# Patient Record
Sex: MALE | Employment: OTHER | URBAN - METROPOLITAN AREA
[De-identification: names, ages, dates, MRNs, and addresses within clinical notes are randomized per-mention and may not be internally consistent; named-entity substitution may affect disease eponyms.]

---

## 2020-10-23 ENCOUNTER — RECORDS - HEALTHEAST (OUTPATIENT)
Dept: LAB | Facility: CLINIC | Age: 75
End: 2020-10-23

## 2020-10-23 LAB — PHENOBARB SERPL-MCNC: 24.7 UG/ML (ref 20–40)

## 2020-12-01 ENCOUNTER — TRANSFERRED RECORDS (OUTPATIENT)
Dept: HEALTH INFORMATION MANAGEMENT | Facility: CLINIC | Age: 75
End: 2020-12-01

## 2020-12-17 ENCOUNTER — TRANSFERRED RECORDS (OUTPATIENT)
Dept: HEALTH INFORMATION MANAGEMENT | Facility: CLINIC | Age: 75
End: 2020-12-17

## 2020-12-30 ENCOUNTER — TRANSFERRED RECORDS (OUTPATIENT)
Dept: HEALTH INFORMATION MANAGEMENT | Facility: CLINIC | Age: 75
End: 2020-12-30

## 2021-01-05 ENCOUNTER — TRANSFERRED RECORDS (OUTPATIENT)
Dept: HEALTH INFORMATION MANAGEMENT | Facility: CLINIC | Age: 76
End: 2021-01-05

## 2021-01-14 ENCOUNTER — TRANSFERRED RECORDS (OUTPATIENT)
Dept: HEALTH INFORMATION MANAGEMENT | Facility: CLINIC | Age: 76
End: 2021-01-14

## 2021-01-18 ENCOUNTER — TRANSFERRED RECORDS (OUTPATIENT)
Dept: HEALTH INFORMATION MANAGEMENT | Facility: CLINIC | Age: 76
End: 2021-01-18

## 2021-01-19 ENCOUNTER — TRANSFERRED RECORDS (OUTPATIENT)
Dept: HEALTH INFORMATION MANAGEMENT | Facility: CLINIC | Age: 76
End: 2021-01-19

## 2021-01-19 ENCOUNTER — MEDICAL CORRESPONDENCE (OUTPATIENT)
Dept: HEALTH INFORMATION MANAGEMENT | Facility: CLINIC | Age: 76
End: 2021-01-19

## 2021-01-20 ENCOUNTER — TELEPHONE (OUTPATIENT)
Dept: VASCULAR SURGERY | Facility: CLINIC | Age: 76
End: 2021-01-20

## 2021-01-20 ENCOUNTER — TRANSCRIBE ORDERS (OUTPATIENT)
Dept: OTHER | Age: 76
End: 2021-01-20

## 2021-01-20 DIAGNOSIS — K76.89 LIVER CYST: Primary | ICD-10-CM

## 2021-01-20 DIAGNOSIS — C18.9 COLON CANCER (H): ICD-10-CM

## 2021-01-20 NOTE — TELEPHONE ENCOUNTER
Called and left a msg fo daughter Erlinda.     Informed her that I am calling to see if we can help make an appt with Dr. Franco as we received a referral from [facility] Dr Blazer.     Left my direct line for her to return my call.     Teresa KEENE RN, BSN  Interventional Radiology/Vascular  Nurse Coordinator   Phone: [phone]  Fax: [phone]

## 2021-01-27 ENCOUNTER — TELEPHONE (OUTPATIENT)
Dept: VASCULAR SURGERY | Facility: CLINIC | Age: 76
End: 2021-01-27

## 2021-01-27 NOTE — TELEPHONE ENCOUNTER
Daughter called in regarding appt with dr. munguia and consult for met colorectal.     She states that she cannot make the 2/2 appt but we will plan for 2/9 at 130pm with Dr. Munguia.     She agrees to plan     Pt is at a TCU in which they are on lockdown at this time. We will plan too connect with her for the consult and then 3-way pt into the consult.     She agrees to plan.     Teresa KEENE RN, BSN  Interventional Radiology/Vascular  Nurse Coordinator   Phone: [phone]  Fax: [phone]

## 2021-01-27 NOTE — TELEPHONE ENCOUNTER
2nd attempt to reach daughter for an appt.     Informed her that we have made an appt for pt and Dr Franco for next week.     Informed her that I have father scheduled for consult on Tues 2/2 at 315pm     Tele visit and we have placed her number as the one to contact on the date of consult    If this doesn''t work for her then I've asked her to return my call at direct line     Teresa KEENE RN, BSN  Interventional Radiology/Vascular  Nurse Coordinator   Phone: [phone]  Fax: [phone]

## 2021-02-08 NOTE — TELEPHONE ENCOUNTER
DIAGNOSIS: New met colorectal, thiago Moore  [phone]    DATE RECEIVED: 2.9.21   NOTES STATUS DETAILS   OFFICE NOTE from referring provider CE 1.11.21, 1.5.21  Dr. Skyler Ravi   [facility]   OFFICE NOTE from other specialist na    DISCHARGE SUMMARY from hospital CE 12.17.20-1.3.21  Dr. Phu DYER Mormon   DISCHARGE REPORT from the ER na    OPERATIVE REPORT CE 12.21.20  Lap Exp Abd   MEDICATION LIST CE    XRAYS (IMAGES & REPORTS) Pacs 1.14.21  CT Chest/Abd/Pelvis    12.30.20  CT Liver Bx    12.17.20, 10.22.20  CT Abd/Pelvis    12.14.20  MR Liver   PATHOLOGY  Slides & report na

## 2021-02-09 ENCOUNTER — VIRTUAL VISIT (OUTPATIENT)
Dept: RADIOLOGY | Facility: CLINIC | Age: 76
End: 2021-02-09
Attending: RADIOLOGY
Payer: COMMERCIAL

## 2021-02-09 ENCOUNTER — PRE VISIT (OUTPATIENT)
Dept: RADIOLOGY | Facility: CLINIC | Age: 76
End: 2021-02-09

## 2021-02-09 DIAGNOSIS — C19 METASTATIC COLORECTAL CANCER: Primary | ICD-10-CM

## 2021-02-09 DIAGNOSIS — C78.7 METASTASIS TO LIVER (H): ICD-10-CM

## 2021-02-09 PROCEDURE — 99203 OFFICE O/P NEW LOW 30 MIN: CPT | Performed by: RADIOLOGY

## 2021-02-09 PROCEDURE — 999N001193 HC VIDEO/TELEPHONE VISIT; NO CHARGE

## 2021-02-09 RX ORDER — GABAPENTIN 100 MG/1
100 CAPSULE ORAL
COMMUNITY
Start: 2021-01-11

## 2021-02-09 RX ORDER — FERROUS SULFATE 325(65) MG
325 TABLET ORAL
COMMUNITY
Start: 2020-09-29

## 2021-02-09 RX ORDER — FOLIC ACID 1 MG/1
1 TABLET ORAL
COMMUNITY
Start: 2020-09-21

## 2021-02-09 RX ORDER — ACETAMINOPHEN 500 MG
1000 TABLET ORAL
COMMUNITY
Start: 2021-02-08

## 2021-02-09 RX ORDER — PHENOBARBITAL 30 MG/1
32.4 TABLET ORAL
COMMUNITY
Start: 2021-01-27

## 2021-02-09 NOTE — LETTER
2/9/2021         RE: Taiwo Yung  [address]  [place] [address]        Dear Colleague,    Thank you for referring your patient, Taiwo Yung, to the [facility]. Please see a copy of my visit note below.    Taiwo is a 75 year old who is being evaluated via a billable video visit.    If the video visit is dropped, the invitation should be resent by:   Will anyone else be joining your video visit?     Video Start Time: 1:30    HPI:  Mr. Yung is a pleasant 75 year old patient referred by Dr. 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  Viral has been diagnosed with a colon cancer that was removed in October 2010. All seems to have started after a fall. He has a history of frequent falls. After the surgery he had bowel obstructions and since then he is just having many health issues.  He has been malnourished and lost a lot of weight. A CT scan showed a liver lesion that was biopsied in December and was diagnosed as a CRC mets.   Dr. Tubbs thinks that the patient is too fragile to have a systemic chemotherapy. His ECOG is 3.  I have discussed with the patient and his daughter. The patient is doing a little better, as he is back home. He is still very fragile. I reviewed with the patient the different liver directed therapy, their risks and benefits.   I have reviewed the images and the labs.     Past Medical History:  AFib, CKD stage 3, epilepsy    Social History     Tobacco Use     Smoking status: Not on file   Substance Use Topics     Alcohol use: Not on file     Current Outpatient Medications   Medication     acetaminophen (TYLENOL) 500 MG tablet     Cyanocobalamin 5000 MCG SUBL     ferrous sulfate (FEROSUL) 325 (65 Fe) MG tablet     folic acid (FOLVITE) 1 MG tablet     gabapentin (NEURONTIN) 100 MG capsule     PHENobarbital (LUMINAL) 30 MG tablet     sertraline (ZOLOFT) 50 MG tablet     No current facility-administered medications for this  visit.        Impression and plan:    At this point, the patient is still not in good condition to undergo any of the IR treatment. I think if he henriquez a little more weight and feels better, he could eventually benefit from Radioembolization.  We agreed that while he tries to eat better and takes care of himself, we should reconnect in about two months with new labs and a new CT scan. Based on his PS and CT, we could decide the right course of action.      Total time: 30 min    Video-Visit Details    Type of service:  Video Visit    Video End Time: 1:47    Originating Location (pt. Location): Home    Distant Location (provider location):  [facility]     Platform used for Video Visit: Shameka is a 75 year old who is being evaluated via a billable video visit.      Again, thank you for allowing me to participate in the care of your patient.      Sincerely,    Ander Franco MD

## 2021-02-09 NOTE — PROGRESS NOTES
Taiwo is a 75 year old who is being evaluated via a billable video visit.    If the video visit is dropped, the invitation should be resent by:   Will anyone else be joining your video visit?     Video Start Time: 1:30    HPI:  Mr. Yung is a pleasant 75 year old patient referred by Dr. 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  Viral has been diagnosed with a colon cancer that was removed in October 2010. All seems to have started after a fall. He has a history of frequent falls. After the surgery he had bowel obstructions and since then he is just having many health issues.  He has been malnourished and lost a lot of weight. A CT scan showed a liver lesion that was biopsied in December and was diagnosed as a CRC mets.   Dr. Tubbs thinks that the patient is too fragile to have a systemic chemotherapy. His ECOG is 3.  I have discussed with the patient and his daughter. The patient is doing a little better, as he is back home. He is still very fragile. I reviewed with the patient the different liver directed therapy, their risks and benefits.   I have reviewed the images and the labs.     Past Medical History:  AFib, CKD stage 3, epilepsy    Social History     Tobacco Use     Smoking status: Not on file   Substance Use Topics     Alcohol use: Not on file     Current Outpatient Medications   Medication     acetaminophen (TYLENOL) 500 MG tablet     Cyanocobalamin 5000 MCG SUBL     ferrous sulfate (FEROSUL) 325 (65 Fe) MG tablet     folic acid (FOLVITE) 1 MG tablet     gabapentin (NEURONTIN) 100 MG capsule     PHENobarbital (LUMINAL) 30 MG tablet     sertraline (ZOLOFT) 50 MG tablet     No current facility-administered medications for this visit.        Impression and plan:    At this point, the patient is still not in good condition to undergo any of the IR treatment. I think if he henriquez a little more weight and feels better, he could eventually benefit from Radioembolization.  We agreed  that while he tries to eat better and takes care of himself, we should reconnect in about two months with new labs and a new CT scan. Based on his PS and CT, we could decide the right course of action.      Total time: 30 min    Video-Visit Details    Type of service:  Video Visit    Video End Time: 1:47    Originating Location (pt. Location): Home    Distant Location (provider location):  [facility]     Platform used for Video Visit: Shameka is a 75 year old who is being evaluated via a billable video visit.

## 2021-02-10 ENCOUNTER — TELEPHONE (OUTPATIENT)
Dept: VASCULAR SURGERY | Facility: CLINIC | Age: 76
End: 2021-02-10

## 2021-02-10 ENCOUNTER — RECORDS - HEALTHEAST (OUTPATIENT)
Dept: LAB | Facility: CLINIC | Age: 76
End: 2021-02-10

## 2021-02-10 NOTE — TELEPHONE ENCOUNTER
Called pt and left a msg    Inquired on their consult yesterday with Dr. Franco.     Asked if she had any questions and am wondering where she'd like to have fup appts at   Whether its at the [facility] or [facility].     Asked that she return my call to further discuss.     Teresa KEENE RN, BSN  Interventional Radiology/Vascular  Nurse Coordinator   Phone: [phone]  Fax: [phone]

## 2021-02-11 LAB
ANION GAP SERPL CALCULATED.3IONS-SCNC: 7 MMOL/L (ref 5–18)
BUN SERPL-MCNC: 25 MG/DL (ref 8–28)
CALCIUM SERPL-MCNC: 9 MG/DL (ref 8.5–10.5)
CHLORIDE BLD-SCNC: 108 MMOL/L (ref 98–107)
CO2 SERPL-SCNC: 26 MMOL/L (ref 22–31)
CREAT SERPL-MCNC: 1 MG/DL (ref 0.7–1.3)
GFR SERPL CREATININE-BSD FRML MDRD: >60 ML/MIN/1.73M2
GLUCOSE BLD-MCNC: 76 MG/DL (ref 70–125)
HGB BLD-MCNC: 10.6 G/DL (ref 14–18)
IRON SERPL-MCNC: 58 UG/DL (ref 42–175)
POTASSIUM BLD-SCNC: 4.3 MMOL/L (ref 3.5–5)
SODIUM SERPL-SCNC: 141 MMOL/L (ref 136–145)
VIT B12 SERPL-MCNC: 731 PG/ML (ref 213–816)

## 2021-02-12 LAB — 25(OH)D3 SERPL-MCNC: 31 NG/ML (ref 30–80)

## 2021-03-31 ENCOUNTER — RECORDS - HEALTHEAST (OUTPATIENT)
Dept: LAB | Facility: CLINIC | Age: 76
End: 2021-03-31

## 2021-04-01 LAB
CHOLEST SERPL-MCNC: 178 MG/DL
FASTING STATUS PATIENT QL REPORTED: NORMAL
HDLC SERPL-MCNC: 52 MG/DL
LDLC SERPL CALC-MCNC: 108 MG/DL
TRIGL SERPL-MCNC: 92 MG/DL

## 2021-04-12 ENCOUNTER — TELEPHONE (OUTPATIENT)
Dept: VASCULAR SURGERY | Facility: CLINIC | Age: 76
End: 2021-04-12

## 2021-04-12 NOTE — TELEPHONE ENCOUNTER
Called daughter to fup on pt's status.    She states that pt has had updated Ct scan as well as labs.     We will pull those images over and also plan for  fup appt to evaluate it pt is an appropriate candidate for Y90 with Dr. Franco.     Due to pt's current living arrangement we will plan for an off day of consult due to pt lives in nursing home and daughter will need to go over to his facility to help with video  visit.     Will plan accordingly and then call her back     Teresa KEENE RN, BSN  Interventional Radiology/Vascular  Nurse Coordinator   Phone: [phone]  Fax: [phone]

## 2021-04-13 ENCOUNTER — TELEPHONE (OUTPATIENT)
Dept: VASCULAR SURGERY | Facility: CLINIC | Age: 76
End: 2021-04-13

## 2021-04-13 NOTE — TELEPHONE ENCOUNTER
Called daughter Erlinda to inform her that we have pt scheduled for Friday with Dr. Franco for follow up consult.     Daughter agreed to plan.     Teresa KEENE RN, BSN  Interventional Radiology/Vascular  Nurse Coordinator   Phone: [phone]  Fax: [phone]

## 2021-04-16 ENCOUNTER — TELEPHONE (OUTPATIENT)
Dept: SURGERY | Facility: CLINIC | Age: 76
End: 2021-04-16

## 2021-04-16 ENCOUNTER — VIRTUAL VISIT (OUTPATIENT)
Dept: VASCULAR SURGERY | Facility: CLINIC | Age: 76
End: 2021-04-16
Payer: COMMERCIAL

## 2021-04-16 DIAGNOSIS — C22.8 PRIMARY MALIGNANT NEOPLASM OF LIVER (H): Primary | ICD-10-CM

## 2021-04-16 PROCEDURE — 99214 OFFICE O/P EST MOD 30 MIN: CPT | Mod: 95 | Performed by: RADIOLOGY

## 2021-04-16 RX ORDER — VITAMIN B COMPLEX
TABLET ORAL DAILY
COMMUNITY

## 2021-04-16 SDOH — HEALTH STABILITY: MENTAL HEALTH: HOW MANY STANDARD DRINKS CONTAINING ALCOHOL DO YOU HAVE ON A TYPICAL DAY?: NOT ASKED

## 2021-04-16 SDOH — HEALTH STABILITY: MENTAL HEALTH: HOW OFTEN DO YOU HAVE 6 OR MORE DRINKS ON ONE OCCASION?: NOT ASKED

## 2021-04-16 SDOH — HEALTH STABILITY: MENTAL HEALTH: HOW OFTEN DO YOU HAVE A DRINK CONTAINING ALCOHOL?: NOT ASKED

## 2021-04-16 ASSESSMENT — PAIN SCALES - GENERAL: PAINLEVEL: NO PAIN (0)

## 2021-04-16 NOTE — TELEPHONE ENCOUNTER
Lm for patient regarding video visit today at 9:30 with .    Called patient home first will try daughter.    Call back number was provided.

## 2021-04-16 NOTE — LETTER
4/16/2021       RE: Taiwo Yung  [address]  [place] [address]     Dear Colleague,    Thank you for referring your patient, Taiwo Yung, to the [facility] [place] [facility]. Please see a copy of my visit note below.    Taiwo is a 75 year old who is being evaluated via a billable video visit.      How would you like to obtain your AVS? Mail a copy  If the video visit is dropped, the invitation should be resent by: Text to cell phone: [phone] doximity  Will anyone else be joining your video visit? No    Video Start Time: 9:30    Subjective      HPI:    Mr. Yung is a pleasant 75 year old patient referred by Dr. 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  Viral has been diagnosed with a colon cancer that was removed in October 2010. All seems to have started after a fall. He has a history of frequent falls. After the surgery he had bowel obstructions and since then he is just having many health issues.  He has been malnourished and lost a lot of weight. A CT scan showed a liver lesion that was biopsied in December and was diagnosed as a CRC mets.   The patient is too fragile to have a systemic chemotherapy and was considered too fragile for a liver directed therapy in February. His ECOG is still 3.  I have discussed with the patient and his daughter. They both think that he is doing better. The patient still thinks he needs some more time to gain some strength.     I have reviewed the images and the labs.   CT performed in March  Was compared to his January CT. The left lobe lesion seems slightly larger.   Past Medical History:  AFib, CKD stage 3, epilepsy     Social History           Tobacco Use     Smoking status: Not on file   Substance Use Topics     Alcohol use: Not on file          Current Outpatient Medications   Medication     acetaminophen (TYLENOL) 500 MG tablet     Cyanocobalamin 5000 MCG  SUBL     ferrous sulfate (FEROSUL) 325 (65 Fe) MG tablet     folic acid (FOLVITE) 1 MG tablet     gabapentin (NEURONTIN) 100 MG capsule     PHENobarbital (LUMINAL) 30 MG tablet     sertraline (ZOLOFT) 50 MG tablet      No current facility-administered medications for this visit.          Impression and plan:     At this point, the patient is still not in good condition to undergo any of the IR treatment. We agreed to wait another month and see how things are going. If he has a better PS, I would proceed with Y 90 of his left lobe lesion and at a later time, an ablation or another Y 90 for his right lobe lesion.     The patient agreed with the plan.      Video-Visit Details    Type of service:  Video Visit    Video End Time:9:45    Originating Location (pt. Location): Home    Distant Location (provider location):  [facility] [place]     Platform used for Video Visit: [employer]     Total time: 35 min.    Again, thank you for allowing me to participate in the care of your patient.      Sincerely,    Ander Franco MD

## 2021-04-16 NOTE — NURSING NOTE
Vascular Rooming Note     Taiwo Yung's goals for this visit include:   Chief Complaint   Patient presents with     SIDNEY Maravilla, is participating in a video visit today for a follow up LDT, feeling well, gaining weight, after latest imaging will there be a discussion regarding future surgery for the liver, as reported by patient and daughter Erlinda.     Kassidy Solano LPN

## 2021-04-16 NOTE — TELEPHONE ENCOUNTER
Spoke with Erlinda daughter of the patient in regards to video visit today with .    Erlinda stated that she was not at her fathers home yet and to call at 98:30.

## 2021-04-16 NOTE — PROGRESS NOTES
Taiwo is a 75 year old who is being evaluated via a billable video visit.      How would you like to obtain your AVS? Mail a copy  If the video visit is dropped, the invitation should be resent by: Text to cell phone: [phone] doximity  Will anyone else be joining your video visit? No    Video Start Time: 9:30        Subjective      HPI:    Mr. Yung is a pleasant 75 year old patient referred by Dr. 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  Viral has been diagnosed with a colon cancer that was removed in October 2010. All seems to have started after a fall. He has a history of frequent falls. After the surgery he had bowel obstructions and since then he is just having many health issues.  He has been malnourished and lost a lot of weight. A CT scan showed a liver lesion that was biopsied in December and was diagnosed as a CRC mets.   The patient is too fragile to have a systemic chemotherapy and was considered too fragile for a liver directed therapy in February. His ECOG is still 3.  I have discussed with the patient and his daughter. They both think that he is doing better. The patient still thinks he needs some more time to gain some strength.     I have reviewed the images and the labs.   CT performed in March  Was compared to his January CT. The left lobe lesion seems slightly larger.   Past Medical History:  AFib, CKD stage 3, epilepsy     Social History           Tobacco Use     Smoking status: Not on file   Substance Use Topics     Alcohol use: Not on file          Current Outpatient Medications   Medication     acetaminophen (TYLENOL) 500 MG tablet     Cyanocobalamin 5000 MCG SUBL     ferrous sulfate (FEROSUL) 325 (65 Fe) MG tablet     folic acid (FOLVITE) 1 MG tablet     gabapentin (NEURONTIN) 100 MG capsule     PHENobarbital (LUMINAL) 30 MG tablet     sertraline (ZOLOFT) 50 MG tablet      No current facility-administered medications for this visit.          Impression  and plan:     At this point, the patient is still not in good condition to undergo any of the IR treatment. We agreed to wait another month and see how things are going. If he has a better PS, I would proceed with Y 90 of his left lobe lesion and at a later time, an ablation or another Y 90 for his right lobe lesion.     The patient agreed with the plan.        Video-Visit Details    Type of service:  Video Visit    Video End Time:9:45    Originating Location (pt. Location): Home    Distant Location (provider location):  [facility] [place]     Platform used for Video Visit: [email or web address]     Total time: 35 min.

## 2021-05-03 ENCOUNTER — TELEPHONE (OUTPATIENT)
Dept: VASCULAR SURGERY | Facility: CLINIC | Age: 76
End: 2021-05-03

## 2021-05-03 DIAGNOSIS — C78.7 METASTASIS TO LIVER (H): ICD-10-CM

## 2021-05-03 DIAGNOSIS — C19 METASTATIC COLORECTAL CANCER: Primary | ICD-10-CM

## 2021-05-04 NOTE — TELEPHONE ENCOUNTER
Called daughter to follow up on the last visit with Dr Franco.     Talked about follow up in which daughter states that Dr Franco would like to do an assessment on the date of mapping.     Informed her that I'll send a consent form to her re: sirtex treatment and then have her father sign and return to me.     She verbalized understanding.     Teresa KEENE RN, BSN  Interventional Radiology/Vascular  Nurse Coordinator   Phone: [phone]  Fax: [phone]

## 2021-05-10 PROBLEM — C18.7 CANCER OF SIGMOID COLON (H): Status: ACTIVE | Noted: 2021-05-10

## 2021-05-10 PROBLEM — C78.7 METASTASIS TO LIVER (H): Status: ACTIVE | Noted: 2021-05-10

## 2021-06-02 ENCOUNTER — TELEPHONE (OUTPATIENT)
Dept: VASCULAR SURGERY | Facility: CLINIC | Age: 76
End: 2021-06-02

## 2021-06-03 NOTE — TELEPHONE ENCOUNTER
Returned daughter's phone call re: Y90 appt    I informed her that I sent her an email in which I am waiting on the consent form.     I have not received it yet.    I did walk her through the process and that currently I have WEds 6/30 and Thurs 7/1 available for Y90 treatment dates for Dr. Franco.    We will connect again when I have pt scheduled.     She agrees to plan.     Teresa KEENE RN, BSN  Interventional Radiology/Vascular  Nurse Coordinator   Phone: [phone]  Fax: [phone]

## 2021-06-04 ENCOUNTER — TELEPHONE (OUTPATIENT)
Dept: VASCULAR SURGERY | Facility: CLINIC | Age: 76
End: 2021-06-04

## 2021-06-04 DIAGNOSIS — C78.7 METASTASIS TO LIVER (H): ICD-10-CM

## 2021-06-04 DIAGNOSIS — C19 METASTATIC COLORECTAL CANCER: Primary | ICD-10-CM

## 2021-06-04 NOTE — TELEPHONE ENCOUNTER
Called daughter and informed her that Dr. Franco did review the Ct scan and since last one was noted in March 2021 he's requesting for an updated CT scan    Daughter states that Dr. Ravi's office will fup with pt post treatment.     I will let Dr. Ravi's office know that Dr. Franco is requesting for updated scans prior to treatment         Teresa KEENE RN, BSN  Interventional Radiology/Vascular  Nurse Coordinator   Phone: [phone]  Fax: [phone]

## 2021-06-07 ENCOUNTER — HOSPITAL ENCOUNTER (OUTPATIENT)
Facility: CLINIC | Age: 76
End: 2021-06-07
Admitting: RADIOLOGY
Payer: COMMERCIAL

## 2021-06-07 DIAGNOSIS — Z11.59 ENCOUNTER FOR SCREENING FOR OTHER VIRAL DISEASES: ICD-10-CM

## 2021-06-11 ENCOUNTER — TEAM CONFERENCE (OUTPATIENT)
Dept: VASCULAR SURGERY | Facility: CLINIC | Age: 76
End: 2021-06-11

## 2021-06-11 NOTE — TELEPHONE ENCOUNTER
Jude Mooer  Thank you for your referral for patient [id number]. Per Bethanie at [facility], a predetermination is not  required when both provider and facility are contracted with the health plan. I have confirmed that both  Dr. Ander Franco and the [facility] are contracted with [facility]. I have completed a benefit  investigation for the patient and have included the details below.  Please see attached the medical Policy for [facility] for your review    [employer] Benefits  [employer] Cap  Effective Date 2/1/21  Coinsurance 100 %  Deductible 0  OOP max 0  Hospital Copay  0    Given that the plan will not provide a pre-determination review we are not able provide an  authorization number and date span for the procedures(s).   We recommend that you notify the treating provider to let them know that there is no authorization  allowed for the procedure.      Y90 Treatment

## 2021-06-23 ENCOUNTER — TELEPHONE (OUTPATIENT)
Dept: VASCULAR SURGERY | Facility: CLINIC | Age: 76
End: 2021-06-23

## 2021-06-23 RX ORDER — HEPARIN SODIUM 200 [USP'U]/100ML
1 INJECTION, SOLUTION INTRAVENOUS CONTINUOUS PRN
Status: CANCELLED | OUTPATIENT
Start: 2021-06-23

## 2021-06-23 RX ORDER — LIDOCAINE 40 MG/G
CREAM TOPICAL
Status: CANCELLED | OUTPATIENT
Start: 2021-06-23

## 2021-06-23 RX ORDER — SODIUM CHLORIDE 9 MG/ML
INJECTION, SOLUTION INTRAVENOUS CONTINUOUS
Status: CANCELLED | OUTPATIENT
Start: 2021-06-23

## 2021-06-23 NOTE — TELEPHONE ENCOUNTER
Called Erlinda to furohan on pt's CT scan that was requested of Dr Tubbs's office    She states that pt is having his CT scan completed on Fri 6/26 at 2pm as well as labs    She talked about the covid test and requested that if we can also accept covid test on Fri 6/26 due to she will be with pt and its' hard to get him out of his LTAC.     I informed her that I will check and the let her know.         Teresa KEENE RN, BSN  Interventional Radiology/Vascular  Nurse Coordinator   Phone: [phone]  Fax: [phone]

## 2021-06-24 ENCOUNTER — TELEPHONE (OUTPATIENT)
Dept: SURGERY | Facility: CLINIC | Age: 76
End: 2021-06-24

## 2021-06-24 NOTE — TELEPHONE ENCOUNTER
Reason for Call:  Other COVID test orders    Detailed comments: Patients daughter KULWINDER Coffey called and wants patient to have COVID testing elsewhere for pre procedures on June 20 and July 1 at [facility].  Would like patient to go to [facility] instead.  States that surgeon and provider at [facility] are working hand in hand and should already receive the report prior to surgery.  If cannot do.  Please contact daughter for the fax number for Dr. Ander Franco so he can get results.  Thank you.    Phone Number Patient can be reached at: Other phone number:  [phone] KULWINDER Coffey*    Best Time: any    Can we leave a detailed message on this number? YES    Call taken on 6/24/2021 at 10:23 AM by Yesenia Bateman

## 2021-06-25 ENCOUNTER — RECORDS - HEALTHEAST (OUTPATIENT)
Dept: LAB | Facility: CLINIC | Age: 76
End: 2021-06-25

## 2021-06-25 RX ORDER — AMPICILLIN AND SULBACTAM 2; 1 G/1; G/1
3 INJECTION, POWDER, FOR SOLUTION INTRAMUSCULAR; INTRAVENOUS
Status: CANCELLED | OUTPATIENT
Start: 2021-06-25

## 2021-06-25 RX ORDER — SODIUM CHLORIDE 9 MG/ML
INJECTION, SOLUTION INTRAVENOUS CONTINUOUS
Status: CANCELLED | OUTPATIENT
Start: 2021-06-25

## 2021-06-25 RX ORDER — HEPARIN SODIUM 200 [USP'U]/100ML
1 INJECTION, SOLUTION INTRAVENOUS CONTINUOUS PRN
Status: CANCELLED | OUTPATIENT
Start: 2021-06-25

## 2021-06-25 RX ORDER — LIDOCAINE 40 MG/G
CREAM TOPICAL
Status: CANCELLED | OUTPATIENT
Start: 2021-06-25

## 2021-06-25 RX ORDER — ONDANSETRON 2 MG/ML
4 INJECTION INTRAMUSCULAR; INTRAVENOUS ONCE
Status: CANCELLED | OUTPATIENT
Start: 2021-06-25 | End: 2021-06-25

## 2021-06-25 NOTE — TELEPHONE ENCOUNTER
Returned daughter's call in which I informed her that due to the circumstances of getting pt in and out for his covid testing and the ease of getting labs, CT scan and covid testing to be done today I informed her that she should go ahead and complete it.     All appts to be done by his PCP at [facility].     I informed her that I did try to send a msg however Dr. Franco is [place] and I've not had a response.     She states that she will see what she can do.      Teresa KEENE RN, BSN  Interventional Radiology/Vascular  Nurse Coordinator   Phone: [phone]  Fax: [phone] g

## 2021-06-28 ENCOUNTER — RECORDS - HEALTHEAST (OUTPATIENT)
Dept: LAB | Facility: CLINIC | Age: 76
End: 2021-06-28

## 2021-06-28 LAB
ANION GAP SERPL CALCULATED.3IONS-SCNC: 12 MMOL/L (ref 5–18)
BASOPHILS # BLD AUTO: 0 THOU/UL (ref 0–0.2)
BASOPHILS NFR BLD AUTO: 0 % (ref 0–2)
BUN SERPL-MCNC: 25 MG/DL (ref 8–28)
CALCIUM SERPL-MCNC: 8.4 MG/DL (ref 8.5–10.5)
CHLORIDE BLD-SCNC: 106 MMOL/L (ref 98–107)
CO2 SERPL-SCNC: 23 MMOL/L (ref 22–31)
CREAT SERPL-MCNC: 1.24 MG/DL (ref 0.7–1.3)
EOSINOPHIL # BLD AUTO: 0.3 THOU/UL (ref 0–0.4)
EOSINOPHIL NFR BLD AUTO: 6 % (ref 0–6)
ERYTHROCYTE [DISTWIDTH] IN BLOOD BY AUTOMATED COUNT: 13.2 % (ref 11–14.5)
FERRITIN SERPL-MCNC: 61 NG/ML (ref 27–300)
GFR SERPL CREATININE-BSD FRML MDRD: 56 ML/MIN/1.73M2
GLUCOSE BLD-MCNC: 90 MG/DL (ref 70–125)
HCT VFR BLD AUTO: 31.3 % (ref 40–54)
HGB BLD-MCNC: 10.6 G/DL (ref 14–18)
IMM GRANULOCYTES # BLD: 0 THOU/UL
IMM GRANULOCYTES NFR BLD: 0 %
IRON SERPL-MCNC: 55 UG/DL (ref 42–175)
LYMPHOCYTES # BLD AUTO: 1.4 THOU/UL (ref 0.8–4.4)
LYMPHOCYTES NFR BLD AUTO: 26 % (ref 20–40)
MCH RBC QN AUTO: 32.9 PG (ref 27–34)
MCHC RBC AUTO-ENTMCNC: 33.9 G/DL (ref 32–36)
MCV RBC AUTO: 97 FL (ref 80–100)
MONOCYTES # BLD AUTO: 0.4 THOU/UL (ref 0–0.9)
MONOCYTES NFR BLD AUTO: 8 % (ref 2–10)
NEUTROPHILS # BLD AUTO: 3.1 THOU/UL (ref 2–7.7)
NEUTROPHILS NFR BLD AUTO: 60 % (ref 50–70)
PLATELET # BLD AUTO: 239 THOU/UL (ref 140–440)
PMV BLD AUTO: 9.9 FL (ref 8.5–12.5)
POTASSIUM BLD-SCNC: 4.3 MMOL/L (ref 3.5–5)
RBC # BLD AUTO: 3.22 MILL/UL (ref 4.4–6.2)
SODIUM SERPL-SCNC: 141 MMOL/L (ref 136–145)
WBC: 5.2 THOU/UL (ref 4–11)

## 2021-06-30 ENCOUNTER — TELEPHONE (OUTPATIENT)
Dept: VASCULAR SURGERY | Facility: CLINIC | Age: 76
End: 2021-06-30

## 2021-06-30 NOTE — TELEPHONE ENCOUNTER
Daughter called and informed me that  was going to call Dr Franco re: canceling Y90     She states that they had the scans completed last week and pt now has Met disease     She state that the Y90 procedure will now need to be canceled.     Informed her that we were unaware of this and that we will go ahead and cancel the procedure and apologized for his news.     Teresa KEENE RN, BSN  Interventional Radiology/Vascular  Nurse Coordinator   Phone: [phone]  Fax: [phone]

## 2022-01-01 ENCOUNTER — LAB REQUISITION (OUTPATIENT)
Dept: LAB | Facility: CLINIC | Age: 77
End: 2022-01-01
Payer: COMMERCIAL

## 2022-01-01 DIAGNOSIS — M62.81 MUSCLE WEAKNESS (GENERALIZED): ICD-10-CM

## 2022-01-01 DIAGNOSIS — J96.01 ACUTE RESPIRATORY FAILURE WITH HYPOXIA (H): ICD-10-CM

## 2022-01-01 DIAGNOSIS — G40.909 EPILEPSY, UNSPECIFIED, NOT INTRACTABLE, WITHOUT STATUS EPILEPTICUS (H): ICD-10-CM

## 2022-01-01 DIAGNOSIS — N18.31 CHRONIC KIDNEY DISEASE, STAGE 3A (H): ICD-10-CM

## 2022-01-01 DIAGNOSIS — R05.1 ACUTE COUGH: ICD-10-CM

## 2022-01-01 LAB
ANION GAP SERPL CALCULATED.3IONS-SCNC: 12 MMOL/L (ref 5–18)
ANION GAP SERPL CALCULATED.3IONS-SCNC: 8 MMOL/L (ref 5–18)
BASOPHILS # BLD AUTO: 0 10E3/UL (ref 0–0.2)
BASOPHILS # BLD AUTO: 0 10E3/UL (ref 0–0.2)
BASOPHILS NFR BLD AUTO: 1 %
BASOPHILS NFR BLD AUTO: 1 %
BUN SERPL-MCNC: 19 MG/DL (ref 8–28)
BUN SERPL-MCNC: 24 MG/DL (ref 8–28)
CALCIUM SERPL-MCNC: 7.9 MG/DL (ref 8.5–10.5)
CALCIUM SERPL-MCNC: 8.2 MG/DL (ref 8.5–10.5)
CHLORIDE BLD-SCNC: 104 MMOL/L (ref 98–107)
CHLORIDE BLD-SCNC: 105 MMOL/L (ref 98–107)
CO2 SERPL-SCNC: 24 MMOL/L (ref 22–31)
CO2 SERPL-SCNC: 26 MMOL/L (ref 22–31)
CREAT SERPL-MCNC: 1.27 MG/DL (ref 0.7–1.3)
CREAT SERPL-MCNC: 1.65 MG/DL (ref 0.7–1.3)
EOSINOPHIL # BLD AUTO: 0.3 10E3/UL (ref 0–0.7)
EOSINOPHIL # BLD AUTO: 0.4 10E3/UL (ref 0–0.7)
EOSINOPHIL NFR BLD AUTO: 7 %
EOSINOPHIL NFR BLD AUTO: 7 %
ERYTHROCYTE [DISTWIDTH] IN BLOOD BY AUTOMATED COUNT: 14.5 % (ref 10–15)
ERYTHROCYTE [DISTWIDTH] IN BLOOD BY AUTOMATED COUNT: 14.6 % (ref 10–15)
ERYTHROCYTE [DISTWIDTH] IN BLOOD BY AUTOMATED COUNT: 14.6 % (ref 10–15)
GFR SERPL CREATININE-BSD FRML MDRD: 42 ML/MIN/1.73M2
GFR SERPL CREATININE-BSD FRML MDRD: 58 ML/MIN/1.73M2
GLUCOSE BLD-MCNC: 100 MG/DL (ref 70–125)
GLUCOSE BLD-MCNC: 117 MG/DL (ref 70–125)
HCT VFR BLD AUTO: 28 % (ref 40–53)
HCT VFR BLD AUTO: 30.1 % (ref 40–53)
HCT VFR BLD AUTO: 34.8 % (ref 40–53)
HGB BLD-MCNC: 11.1 G/DL (ref 13.3–17.7)
HGB BLD-MCNC: 9.1 G/DL (ref 13.3–17.7)
HGB BLD-MCNC: 9.9 G/DL (ref 13.3–17.7)
IMM GRANULOCYTES # BLD: 0 10E3/UL
IMM GRANULOCYTES # BLD: 0 10E3/UL
IMM GRANULOCYTES NFR BLD: 0 %
IMM GRANULOCYTES NFR BLD: 0 %
LYMPHOCYTES # BLD AUTO: 1 10E3/UL (ref 0.8–5.3)
LYMPHOCYTES # BLD AUTO: 1.5 10E3/UL (ref 0.8–5.3)
LYMPHOCYTES NFR BLD AUTO: 25 %
LYMPHOCYTES NFR BLD AUTO: 26 %
MCH RBC QN AUTO: 34.6 PG (ref 26.5–33)
MCH RBC QN AUTO: 34.9 PG (ref 26.5–33)
MCH RBC QN AUTO: 35 PG (ref 26.5–33)
MCHC RBC AUTO-ENTMCNC: 31.9 G/DL (ref 31.5–36.5)
MCHC RBC AUTO-ENTMCNC: 32.5 G/DL (ref 31.5–36.5)
MCHC RBC AUTO-ENTMCNC: 32.9 G/DL (ref 31.5–36.5)
MCV RBC AUTO: 106 FL (ref 78–100)
MCV RBC AUTO: 107 FL (ref 78–100)
MCV RBC AUTO: 108 FL (ref 78–100)
MONOCYTES # BLD AUTO: 0.3 10E3/UL (ref 0–1.3)
MONOCYTES # BLD AUTO: 0.4 10E3/UL (ref 0–1.3)
MONOCYTES NFR BLD AUTO: 7 %
MONOCYTES NFR BLD AUTO: 7 %
NEUTROPHILS # BLD AUTO: 2.5 10E3/UL (ref 1.6–8.3)
NEUTROPHILS # BLD AUTO: 3.3 10E3/UL (ref 1.6–8.3)
NEUTROPHILS NFR BLD AUTO: 59 %
NEUTROPHILS NFR BLD AUTO: 60 %
NRBC # BLD AUTO: 0 10E3/UL
NRBC # BLD AUTO: 0 10E3/UL
NRBC BLD AUTO-RTO: 0 /100
NRBC BLD AUTO-RTO: 0 /100
PHENOBARB SERPL-MCNC: 19.4 UG/ML
PLATELET # BLD AUTO: 143 10E3/UL (ref 150–450)
PLATELET # BLD AUTO: 169 10E3/UL (ref 150–450)
PLATELET # BLD AUTO: 259 10E3/UL (ref 150–450)
POTASSIUM BLD-SCNC: 4.4 MMOL/L (ref 3.5–5)
POTASSIUM BLD-SCNC: 4.6 MMOL/L (ref 3.5–5)
RBC # BLD AUTO: 2.61 10E6/UL (ref 4.4–5.9)
RBC # BLD AUTO: 2.83 10E6/UL (ref 4.4–5.9)
RBC # BLD AUTO: 3.21 10E6/UL (ref 4.4–5.9)
SODIUM SERPL-SCNC: 137 MMOL/L (ref 136–145)
SODIUM SERPL-SCNC: 142 MMOL/L (ref 136–145)
WBC # BLD AUTO: 3.7 10E3/UL (ref 4–11)
WBC # BLD AUTO: 4.2 10E3/UL (ref 4–11)
WBC # BLD AUTO: 5.6 10E3/UL (ref 4–11)

## 2022-01-01 PROCEDURE — 85025 COMPLETE CBC W/AUTO DIFF WBC: CPT | Mod: ORL | Performed by: NURSE PRACTITIONER

## 2022-01-01 PROCEDURE — 36415 COLL VENOUS BLD VENIPUNCTURE: CPT | Mod: ORL | Performed by: NURSE PRACTITIONER

## 2022-01-01 PROCEDURE — P9604 ONE-WAY ALLOW PRORATED TRIP: HCPCS | Mod: ORL | Performed by: NURSE PRACTITIONER

## 2022-01-01 PROCEDURE — 80048 BASIC METABOLIC PNL TOTAL CA: CPT | Mod: ORL | Performed by: NURSE PRACTITIONER

## 2022-01-01 PROCEDURE — 80184 ASSAY OF PHENOBARBITAL: CPT | Mod: ORL | Performed by: NURSE PRACTITIONER

## 2022-01-01 PROCEDURE — 85027 COMPLETE CBC AUTOMATED: CPT | Mod: ORL | Performed by: NURSE PRACTITIONER

## 2022-01-10 ENCOUNTER — DOCUMENTATION ONLY (OUTPATIENT)
Dept: OTHER | Facility: CLINIC | Age: 77
End: 2022-01-10
Payer: COMMERCIAL

## 2022-01-18 ENCOUNTER — DOCUMENTATION ONLY (OUTPATIENT)
Dept: OTHER | Facility: CLINIC | Age: 77
End: 2022-01-18
Payer: COMMERCIAL